# Patient Record
Sex: FEMALE
[De-identification: names, ages, dates, MRNs, and addresses within clinical notes are randomized per-mention and may not be internally consistent; named-entity substitution may affect disease eponyms.]

---

## 2023-03-13 ENCOUNTER — NON-APPOINTMENT (OUTPATIENT)
Age: 33
End: 2023-03-13

## 2023-03-13 ENCOUNTER — APPOINTMENT (OUTPATIENT)
Dept: ORTHOPEDIC SURGERY | Facility: CLINIC | Age: 33
End: 2023-03-13
Payer: MEDICAID

## 2023-03-13 VITALS — BODY MASS INDEX: 33.75 KG/M2 | HEIGHT: 66 IN | WEIGHT: 210 LBS

## 2023-03-13 DIAGNOSIS — S82.851A DISPLACED TRIMALLEOLAR FRACTURE OF RIGHT LOWER LEG, INITIAL ENCOUNTER FOR CLOSED FRACTURE: ICD-10-CM

## 2023-03-13 PROBLEM — Z00.00 ENCOUNTER FOR PREVENTIVE HEALTH EXAMINATION: Status: ACTIVE | Noted: 2023-03-13

## 2023-03-13 PROCEDURE — 99204 OFFICE O/P NEW MOD 45 MIN: CPT

## 2023-03-13 NOTE — HISTORY OF PRESENT ILLNESS
[FreeTextEntry1] : The patient is a 32-year-old female who presents with a right ankle fracture.  Patient states that last Wednesday, 3/8/23, she fell at her house on stairs, rolled her ankle and suffered a right trimalleolar fracture.  It was reduced at [facility] and a CT scan was completed.  Patient presents NWB in a protective splint from [place].  Patient is currently on Lovenox injections daily for DVT prophylaxis.  Pain scale 7/10.  No fevers, chills, night sweats, SOB, chest pain.  No other complaints. \par \par Presents with her spouse in office today.  Patient is currently 27 weeks pregnant.\par \par Second opinion.

## 2023-03-13 NOTE — PHYSICAL EXAM
[de-identified] : Right ankle Physical Examination:\par \par General: Alert and oriented x3.  In no acute distress.  Pleasant in nature with a normal affect.  No apparent respiratory distress. \par Erythema, Warmth, Rubor: Negative\par Swelling: Positive\par \par Anterior window was created to evaluate the anterior skin and there were no fracture blisters, open wounds or signs of infection.\par \par Ankle range of motion and special tests were not performed due to the fracture.\par \par Neurovascularly intact.  Capillary refill in the toes was normal.\par  [de-identified] : CT scan lower extremity without IV contrast right ankle from [facility] done on 3/8/2023:\par Status post closed reduction and casting of trimalleolar fracture.  Small intra-articular fragments within the ankle joint.  Ankle joint effusion.  Coalescent edema/hematoma about the ankle.